# Patient Record
Sex: FEMALE | Race: OTHER | NOT HISPANIC OR LATINO
[De-identification: names, ages, dates, MRNs, and addresses within clinical notes are randomized per-mention and may not be internally consistent; named-entity substitution may affect disease eponyms.]

---

## 2021-04-09 ENCOUNTER — APPOINTMENT (OUTPATIENT)
Dept: GYNECOLOGIC ONCOLOGY | Facility: CLINIC | Age: 86
End: 2021-04-09
Payer: MEDICARE

## 2021-04-09 ENCOUNTER — NON-APPOINTMENT (OUTPATIENT)
Age: 86
End: 2021-04-09

## 2021-04-09 VITALS
WEIGHT: 195 LBS | DIASTOLIC BLOOD PRESSURE: 86 MMHG | BODY MASS INDEX: 34.55 KG/M2 | SYSTOLIC BLOOD PRESSURE: 150 MMHG | HEIGHT: 63 IN | TEMPERATURE: 98.3 F

## 2021-04-09 DIAGNOSIS — C56.9 MALIGNANT NEOPLASM OF UNSPECIFIED OVARY: ICD-10-CM

## 2021-04-09 DIAGNOSIS — C56.1 MALIGNANT NEOPLASM OF RIGHT OVARY: ICD-10-CM

## 2021-04-09 DIAGNOSIS — Z86.39 PERSONAL HISTORY OF OTHER ENDOCRINE, NUTRITIONAL AND METABOLIC DISEASE: ICD-10-CM

## 2021-04-09 DIAGNOSIS — C56.2 MALIGNANT NEOPLASM OF RIGHT OVARY: ICD-10-CM

## 2021-04-09 PROCEDURE — 99204 OFFICE O/P NEW MOD 45 MIN: CPT

## 2021-04-09 PROCEDURE — 99072 ADDL SUPL MATRL&STAF TM PHE: CPT

## 2021-04-09 NOTE — ASSESSMENT
[FreeTextEntry1] : 89 year old patient  ( x4)   diagnosed with malignant ascites, ovarian mass and neuroendocrine tumor of the GI tract in 2021.   was 2016 at time of paracentesis (2021) .   She has multiple medical conditions and she was unable to be cleared for surgery.     She is the Mother of 2 patients under my care for gynecological malignances with Louie syndrome .   Patient has A-Fib on Eliquis and has multiple medical comorbidities..   She was started on Chemotherapy (carboplantin/taxol)  at [facility].  Her Most recent  is 210 after 2.5 cycles of chemotherapy.  She presents for a  2nd opinion. She is relatively asymptomatic at this time and has responded well to Her current regimen.

## 2021-04-09 NOTE — DISCUSSION/SUMMARY
[Visit Time ___ Minutes] : [unfilled] minutes [Face to Face Time___ Minutes] : with [unfilled] minutes in face to face consultation.

## 2021-04-09 NOTE — HISTORY OF PRESENT ILLNESS
[FreeTextEntry1] : 89 year old patient  ( x4)   diagnosed with malignant ascites, ovarian mass and neuroendocrine tumor of the GI tract in 2021.   was 2016 at time of paracentesis (2021) .   She has multiple medical conditions and she was unable to be cleared for surgery.     She is the Mother of 2 patients under my care for gynecological malignances with Louie syndrome .   Patient has A-Fib on Eliquis and has multiple medical comorbidities..   She was started on Chemotherapy (carboplantin/taxol)  at [facility].  Her Most recent  is 210 after 2.5 cycles of chemotherapy.  She presents for a  2nd opinion. She is relatively asymptomatic at this time and has responded well to Her current regimen.